# Patient Record
Sex: MALE | Race: BLACK OR AFRICAN AMERICAN | Employment: FULL TIME | URBAN - METROPOLITAN AREA
[De-identification: names, ages, dates, MRNs, and addresses within clinical notes are randomized per-mention and may not be internally consistent; named-entity substitution may affect disease eponyms.]

---

## 2018-02-01 ENCOUNTER — HOSPITAL ENCOUNTER (EMERGENCY)
Age: 33
Discharge: HOME | End: 2018-02-01
Attending: EMERGENCY MEDICINE | Admitting: EMERGENCY MEDICINE
Payer: SELF-PAY

## 2018-02-01 VITALS
DIASTOLIC BLOOD PRESSURE: 82 MMHG | TEMPERATURE: 97.7 F | OXYGEN SATURATION: 100 % | BODY MASS INDEX: 36.31 KG/M2 | HEART RATE: 86 BPM | SYSTOLIC BLOOD PRESSURE: 143 MMHG | WEIGHT: 253.6 LBS | RESPIRATION RATE: 16 BRPM | HEIGHT: 70 IN

## 2018-02-01 DIAGNOSIS — H60.392 OTHER INFECTIVE ACUTE OTITIS EXTERNA OF LEFT EAR: ICD-10-CM

## 2018-02-01 DIAGNOSIS — H66.003 ACUTE SUPPURATIVE OTITIS MEDIA OF BOTH EARS WITHOUT SPONTANEOUS RUPTURE OF TYMPANIC MEMBRANES, RECURRENCE NOT SPECIFIED: Primary | ICD-10-CM

## 2018-02-01 PROCEDURE — 99282 EMERGENCY DEPT VISIT SF MDM: CPT

## 2018-02-01 PROCEDURE — 74011250637 HC RX REV CODE- 250/637: Performed by: NURSE PRACTITIONER

## 2018-02-01 RX ORDER — AMOXICILLIN AND CLAVULANATE POTASSIUM 875; 125 MG/1; MG/1
1 TABLET, FILM COATED ORAL 2 TIMES DAILY
Qty: 14 TAB | Refills: 0 | Status: SHIPPED | OUTPATIENT
Start: 2018-02-01

## 2018-02-01 RX ORDER — CIPROFLOXACIN AND DEXAMETHASONE 3; 1 MG/ML; MG/ML
4 SUSPENSION/ DROPS AURICULAR (OTIC) 2 TIMES DAILY
Qty: 7.5 ML | Refills: 0 | Status: SHIPPED | OUTPATIENT
Start: 2018-02-01 | End: 2018-02-08

## 2018-02-01 RX ORDER — IBUPROFEN 400 MG/1
800 TABLET ORAL
Status: COMPLETED | OUTPATIENT
Start: 2018-02-01 | End: 2018-02-01

## 2018-02-01 RX ADMIN — IBUPROFEN 800 MG: 400 TABLET, FILM COATED ORAL at 18:41

## 2018-02-01 NOTE — ED NOTES
Patient discharged by NP. Results and discharge instructions reviewed with the patient by NP. Patient verbalizes understanding. Patient discharged home, stable, ambulatory, in no acute distress.

## 2018-02-01 NOTE — ED TRIAGE NOTES
Pt arrives complaining of left ear pain, headache, generalized body aches x 3 days. Pt states he is unable to hear out of left ear and has seen blood from ear. Denies fever.

## 2018-02-01 NOTE — DISCHARGE INSTRUCTIONS
Swimmer's Ear: Care Instructions  Your Care Instructions    Swimmer's ear (otitis externa) is inflammation or infection of the ear canal. This is the passage that leads from the outer ear to the eardrum. Any water, sand, or other debris that gets into the ear canal and stays there can cause swimmer's ear. Putting cotton swabs or other items in the ear to clean it can also cause this problem. Swimmer's ear can be very painful. But you can treat the pain and infection with medicines. You should feel better in a few days. Follow-up care is a key part of your treatment and safety. Be sure to make and go to all appointments, and call your doctor if you are having problems. It's also a good idea to know your test results and keep a list of the medicines you take. How can you care for yourself at home? Cleaning and care  · Use antibiotic drops as your doctor directs. · Do not insert ear drops (other than the antibiotic ear drops) or anything else into the ear unless your doctor has told you to. · Avoid getting water in the ear until the problem clears up. Use cotton lightly coated with petroleum jelly as an earplug. Do not use plastic earplugs. · Use a hair dryer set on low to carefully dry the ear after you shower. · To ease ear pain, hold a warm washcloth against your ear. · Take pain medicines exactly as directed. ¨ If the doctor gave you a prescription medicine for pain, take it as prescribed. ¨ If you are not taking a prescription pain medicine, ask your doctor if you can take an over-the-counter medicine. Inserting ear drops  · Warm the drops to body temperature by rolling the container in your hands. Or you can place it in a cup of warm water for a few minutes. · Lie down, with your ear facing up. · Place drops inside the ear. Follow your doctor's instructions (or the directions on the label) for how many drops to use.  Gently wiggle the outer ear or pull the ear up and back to help the drops get into the ear. · It's important to keep the liquid in the ear canal for 3 to 5 minutes. When should you call for help? Call your doctor now or seek immediate medical care if:  ? · You have a new or higher fever. ? · You have new or worse pain, swelling, warmth, or redness around or behind your ear. ? · You have new or increasing pus or blood draining from your ear. ? Watch closely for changes in your health, and be sure to contact your doctor if:  ? · You are not getting better after 2 days (48 hours). Where can you learn more? Go to http://lloyd-porter.info/. Enter C706 in the search box to learn more about \"Swimmer's Ear: Care Instructions. \"  Current as of: May 12, 2017  Content Version: 11.4  © 4396-6479 [employer]. Care instructions adapted under license by [employer] (which disclaims liability or warranty for this information). If you have questions about a medical condition or this instruction, always ask your healthcare professional. [place] [address] any warranty or liability for your use of this information. Ear Infection (Otitis Media): Care Instructions  Your Care Instructions    An ear infection may start with a cold and affect the middle ear (otitis media). It can hurt a lot. Most ear infections clear up on their own in a couple of days. Most often you will not need antibiotics. This is because many ear infections are caused by a virus. Antibiotics don't work against a virus. Regular doses of pain medicines are the best way to reduce your fever and help you feel better. Follow-up care is a key part of your treatment and safety. Be sure to make and go to all appointments, and call your doctor if you are having problems. It's also a good idea to know your test results and keep a list of the medicines you take. How can you care for yourself at home? · Take pain medicines exactly as directed.   ¨ If the doctor gave you a prescription medicine for pain, take it as prescribed. ¨ If you are not taking a prescription pain medicine, take an over-the-counter medicine, such as acetaminophen (Tylenol), ibuprofen (Advil, Motrin), or naproxen (Aleve). Read and follow all instructions on the label. ¨ Do not take two or more pain medicines at the same time unless the doctor told you to. Many pain medicines have acetaminophen, which is Tylenol. Too much acetaminophen (Tylenol) can be harmful. · Plan to take a full dose of pain reliever before bedtime. Getting enough sleep will help you get better. · Try a warm, moist washcloth on the ear. It may help relieve pain. · If your doctor prescribed antibiotics, take them as directed. Do not stop taking them just because you feel better. You need to take the full course of antibiotics. When should you call for help? Call your doctor now or seek immediate medical care if:  ? · You have new or increasing ear pain. ? · You have new or increasing pus or blood draining from your ear. ? · You have a fever with a stiff neck or a severe headache. ? Watch closely for changes in your health, and be sure to contact your doctor if:  ? · You have new or worse symptoms. ? · You are not getting better after taking an antibiotic for 2 days. Where can you learn more? Go to http://lloyd-porter.info/. Enter U793 in the search box to learn more about \"Ear Infection (Otitis Media): Care Instructions. \"  Current as of: May 12, 2017  Content Version: 11.4  © 0621-2892 [employer]. Care instructions adapted under license by [employer] (which disclaims liability or warranty for this information). If you have questions about a medical condition or this instruction, always ask your healthcare professional. [address] any warranty or liability for your use of this information. We hope that we have addressed all of your medical concerns. The examination and treatment you received in the Emergency Department were for an emergent problem and were not intended as complete care. It is important that you follow up with your healthcare provider(s) for ongoing care. If your symptoms worsen or do not improve as expected, and you are unable to reach your usual health care provider(s), you should return to the Emergency Department. Today's healthcare is undergoing tremendous change, and patient satisfaction surveys are one of the many tools to assess the quality of medical care. You may receive a survey from the [employer] regarding your experience in the Emergency Department. I hope that your experience has been completely positive, particularly the medical care that I provided. As such, please participate in the survey; anything less than excellent does not meet my expectations or intentions. [facility] and [facility] participate in nationally recognized quality of care measures. If your blood pressure is greater than 120/80, as reported below, we urge that you seek medical care to address the potential of high blood pressure, commonly known as hypertension. Hypertension can be hereditary or can be caused by certain medical conditions, pain, stress, or \"white coat syndrome. \"       Please make an appointment with your health care provider(s) for follow up of your Emergency Department visit. VITALS:   Patient Vitals for the past 8 hrs:   Temp Pulse Resp BP SpO2   02/01/18 1757 97.7 °F (36.5 °C) 86 16 143/82 100 %          Thank you for allowing us to provide you with medical care today. We realize that you have many choices for your emergency care needs. Please choose us in the future for any continued health care needs. [address]: [phone]            No results found for this or any previous visit (from the past 24 hour(s)). No results found.

## 2024-07-19 ENCOUNTER — OFFICE VISIT (OUTPATIENT)
Age: 39
End: 2024-07-19

## 2024-07-19 VITALS
OXYGEN SATURATION: 96 % | TEMPERATURE: 98.9 F | RESPIRATION RATE: 18 BRPM | HEART RATE: 57 BPM | SYSTOLIC BLOOD PRESSURE: 102 MMHG | HEIGHT: 70 IN | BODY MASS INDEX: 36.22 KG/M2 | WEIGHT: 253 LBS | DIASTOLIC BLOOD PRESSURE: 65 MMHG

## 2024-07-19 DIAGNOSIS — G89.29 CHRONIC PAIN OF LEFT KNEE: ICD-10-CM

## 2024-07-19 DIAGNOSIS — K21.00 GASTROESOPHAGEAL REFLUX DISEASE WITH ESOPHAGITIS WITHOUT HEMORRHAGE: ICD-10-CM

## 2024-07-19 DIAGNOSIS — R07.9 CHEST PAIN, UNSPECIFIED TYPE: ICD-10-CM

## 2024-07-19 DIAGNOSIS — Z76.89 ENCOUNTER TO ESTABLISH CARE: Primary | ICD-10-CM

## 2024-07-19 DIAGNOSIS — G43.119 INTRACTABLE MIGRAINE WITH AURA WITHOUT STATUS MIGRAINOSUS: ICD-10-CM

## 2024-07-19 DIAGNOSIS — M25.562 CHRONIC PAIN OF LEFT KNEE: ICD-10-CM

## 2024-07-19 DIAGNOSIS — Z00.00 LABORATORY EXAMINATION ORDERED AS PART OF A COMPLETE PHYSICAL EXAMINATION: ICD-10-CM

## 2024-07-19 DIAGNOSIS — B07.0 PLANTAR WART: ICD-10-CM

## 2024-07-19 DIAGNOSIS — E66.9 OBESITY (BMI 30-39.9): ICD-10-CM

## 2024-07-19 RX ORDER — SALICYLIC ACID 260 MG/ML
LIQUID TOPICAL
Qty: 10 ML | Refills: 2 | Status: SHIPPED | OUTPATIENT
Start: 2024-07-19

## 2024-07-19 RX ORDER — AMITRIPTYLINE HYDROCHLORIDE 25 MG/1
25 TABLET, FILM COATED ORAL NIGHTLY
Qty: 90 TABLET | Refills: 0 | Status: SHIPPED | OUTPATIENT
Start: 2024-07-19

## 2024-07-19 SDOH — ECONOMIC STABILITY: FOOD INSECURITY: WITHIN THE PAST 12 MONTHS, THE FOOD YOU BOUGHT JUST DIDN'T LAST AND YOU DIDN'T HAVE MONEY TO GET MORE.: NEVER TRUE

## 2024-07-19 SDOH — ECONOMIC STABILITY: FOOD INSECURITY: WITHIN THE PAST 12 MONTHS, YOU WORRIED THAT YOUR FOOD WOULD RUN OUT BEFORE YOU GOT MONEY TO BUY MORE.: NEVER TRUE

## 2024-07-19 SDOH — ECONOMIC STABILITY: HOUSING INSECURITY
IN THE LAST 12 MONTHS, WAS THERE A TIME WHEN YOU DID NOT HAVE A STEADY PLACE TO SLEEP OR SLEPT IN A SHELTER (INCLUDING NOW)?: NO

## 2024-07-19 SDOH — ECONOMIC STABILITY: INCOME INSECURITY: HOW HARD IS IT FOR YOU TO PAY FOR THE VERY BASICS LIKE FOOD, HOUSING, MEDICAL CARE, AND HEATING?: NOT HARD AT ALL

## 2024-07-19 ASSESSMENT — PATIENT HEALTH QUESTIONNAIRE - PHQ9
SUM OF ALL RESPONSES TO PHQ QUESTIONS 1-9: 0
2. FEELING DOWN, DEPRESSED OR HOPELESS: NOT AT ALL
1. LITTLE INTEREST OR PLEASURE IN DOING THINGS: NOT AT ALL
SUM OF ALL RESPONSES TO PHQ QUESTIONS 1-9: 0
SUM OF ALL RESPONSES TO PHQ9 QUESTIONS 1 & 2: 0
SUM OF ALL RESPONSES TO PHQ QUESTIONS 1-9: 0
SUM OF ALL RESPONSES TO PHQ QUESTIONS 1-9: 0

## 2024-07-19 NOTE — PROGRESS NOTES
Rupesh Schwartz is a 38 y.o. male  Chief Complaint   Patient presents with    New Patient     No concerns      Vitals:    07/19/24 1447   BP: 102/65   Pulse: 57   Resp: 18   Temp: 98.9 °F (37.2 °C)   SpO2: 96%          HPI  Mr.Brandon Schwartz is a 38 y.o. male with history of GERD and Migraine presents to [facility] care. He complains about knee pain after  service, he feels his left knee is still and has severe pain. He also has migraine headache which he takes multiple BC powder, the hedache may last for at least a day and is describes as pressure and disabling. Patient also reports about reflux symptoms with heartburn, regurgitation. He complains about a chest pain in the last month with certain movement,non-exertional pain, has no associated shortness of breath, diaphoresis      GERD: try OTC omeprazole for 2 weeks     Headache.knee pain, musculoskeletal chest pain: start amitriptyline, get x-ray of knee and  CXR.      Plantar warts: tried multiple OTC meds without benefit, start       HM: discussed on healthy diet and exercise and vaccinations.     Past Medical History:   Diagnosis Date    Acid reflux     Migraines       ROS  Review of Systems   Constitutional:  Negative for fever.   Respiratory:  Negative for cough and shortness of breath.    Cardiovascular:  Negative for chest pain and palpitations.   Neurological:  Negative for headaches.   Psychiatric/Behavioral:  Negative for dysphoric mood.            EXAM  Physical Exam  Vitals and nursing note reviewed.   HENT:      Head: Normocephalic and atraumatic.   Cardiovascular:      Rate and Rhythm: Normal rate and regular rhythm.      Pulses: Normal pulses.      Heart sounds: Normal heart sounds. No murmur heard.  Pulmonary:      Effort: Pulmonary effort is normal. No respiratory distress.      Breath sounds: Normal breath sounds.   Abdominal:      Palpations: There is no mass.      Tenderness: There is no right CVA tenderness, left CVA tenderness or

## 2024-07-19 NOTE — PROGRESS NOTES
I have reviewed all needed documentation in preparation for visit. Verified patient by name and date of birth  Chief Complaint   Patient presents with    New Patient     No concerns        Vitals:    07/19/24 1447   BP: 102/65   Site: Right Upper Arm   Position: Sitting   Cuff Size: Medium Adult   Pulse: 57   Resp: 18   Temp: 98.9 °F (37.2 °C)   TempSrc: Temporal   SpO2: 96%   Weight: 114.8 kg (253 lb)   Height: 1.778 m (5' 10\")       Health Maintenance Due   Topic Date Due    Hepatitis B vaccine (1 of 3 - 3-dose series) Never done    COVID-19 Vaccine (1) Never done    Varicella vaccine (1 of 2 - 2-dose childhood series) Never done    Depression Screen  Never done    HIV screen  Never done    Hepatitis C screen  Never done    DTaP/Tdap/Td vaccine (1 - Tdap) Never done     \"Have you been to the ER, urgent care clinic since your last visit?  Hospitalized since your last visit?\"    NO    “Have you seen or consulted any other health care providers outside of [facility] since your last visit?”    NO            Click Here for Release of Records Request         Kamila eng [facility]

## 2025-05-14 NOTE — ED PROVIDER NOTES
HPI Comments: 33 y.o. male with no significant past medical history who presents from home with chief complaint of left ear pain, headache, generalized body aches x 3 days. Pt states he is unable to hear out of left ear and has seen blood from ear. Denies fever. There are no other acute medical concerns at this time. PCP: None    Past Medical History:  No date: Migraine      The history is provided by the patient. No  was used. Past Medical History:   Diagnosis Date    Migraine        History reviewed. No pertinent surgical history. History reviewed. No pertinent family history. Social History     Social History    Marital status: SINGLE     Spouse name: N/A    Number of children: N/A    Years of education: N/A     Occupational History    Not on file. Social History Main Topics    Smoking status: Not on file    Smokeless tobacco: Not on file    Alcohol use Not on file    Drug use: Not on file    Sexual activity: Not on file     Other Topics Concern    Not on file     Social History Narrative         ALLERGIES: Review of patient's allergies indicates no known allergies. Review of Systems   Constitutional: Negative. Negative for chills, diaphoresis and fever. HENT: Positive for ear pain, hearing loss and trouble swallowing. Negative for congestion and rhinorrhea. Eyes: Negative. Respiratory: Negative. Negative for shortness of breath. Cardiovascular: Negative. Gastrointestinal: Negative. Negative for abdominal pain, nausea and vomiting. Endocrine: Negative. Musculoskeletal: Negative for arthralgias, myalgias, neck pain and neck stiffness. Skin: Negative. Negative for rash. Allergic/Immunologic: Negative. Neurological: Positive for headaches. Negative for dizziness, syncope and weakness. Hematological: Negative. Psychiatric/Behavioral: Negative.         Vitals:    02/01/18 1757   BP: 143/82   Pulse: 86   Resp: 16   Temp: 97.7 °F (36.5 °C)   SpO2: 100%   Weight: 115 kg (253 lb 9.6 oz)   Height: 5' 10\" (1.778 m)            Physical Exam   Constitutional: He is oriented to person, place, and time. Vital signs are normal. He appears well-developed and well-nourished. Non-toxic appearance. He does not have a sickly appearance. He does not appear ill. HENT:   Head: Normocephalic and atraumatic. Right Ear: Hearing normal. There is tenderness. Tympanic membrane is erythematous and bulging. Left Ear: There is tenderness. Tympanic membrane is erythematous and bulging. Decreased hearing is noted. Mouth/Throat: Uvula is midline and mucous membranes are normal. Posterior oropharyngeal erythema present. Reddened canal on the left  Purulent drainage behind left and right TM   Eyes: Conjunctivae, EOM and lids are normal. Pupils are equal, round, and reactive to light. Neck: Trachea normal, normal range of motion and full passive range of motion without pain. Neck supple. Cardiovascular: Normal rate, regular rhythm, normal heart sounds and normal pulses. Pulmonary/Chest: Effort normal and breath sounds normal.   Abdominal: Soft. Normal appearance and bowel sounds are normal.   Musculoskeletal: Normal range of motion. Neurological: He is alert and oriented to person, place, and time. He has normal strength. GCS eye subscore is 4. GCS verbal subscore is 5. GCS motor subscore is 6. Skin: Skin is warm, dry and intact. Psychiatric: He has a normal mood and affect. His speech is normal and behavior is normal. Judgment and thought content normal. Cognition and memory are normal.   Nursing note and vitals reviewed.        MDM  Number of Diagnoses or Management Options  Acute suppurative otitis media of both ears without spontaneous rupture of tympanic membranes, recurrence not specified: new and requires workup  Other infective acute otitis externa of left ear: new and requires workup  Risk of Complications, Morbidity, and/or Mortality  Presenting problems: low  Diagnostic procedures: low  Management options: low    Patient Progress  Patient progress: stable        ED Course       Procedures    LABORATORY TESTS:  No results found for this or any previous visit (from the past 12 hour(s)). IMAGING RESULTS:    MEDICATIONS GIVEN:  Medications   ibuprofen (MOTRIN) tablet 800 mg (not administered)       IMPRESSION:  1. Acute suppurative otitis media of both ears without spontaneous rupture of tympanic membranes, recurrence not specified    2. Other infective acute otitis externa of left ear        PLAN:  1. Augmentin and ciprodex  2. F/U with Derik Oconnell at [facility]  3. Get info about care card from registration  Return to ED if worse    Discharge Note  6:37 PM  The patient is ready for discharge. The patient's signs, symptoms, diagnosis, and discharge instructions have been discussed and the patient has conveyed their understanding. The patient is to follow up as recommended or return to the ER should their symptoms worsen. Plan has been discussed and the patient is in agreement. Brandyn Vega Pagé FNP-BC. [Fever] : no fever [Chills] : no chills [Shortness Of Breath] : no shortness of breath [Cough] : no cough [Skin Lesions] : no skin lesions [Skin Wound] : no skin wound